# Patient Record
Sex: FEMALE | Race: BLACK OR AFRICAN AMERICAN | NOT HISPANIC OR LATINO | URBAN - METROPOLITAN AREA
[De-identification: names, ages, dates, MRNs, and addresses within clinical notes are randomized per-mention and may not be internally consistent; named-entity substitution may affect disease eponyms.]

---

## 2017-04-20 ENCOUNTER — OUTPATIENT (OUTPATIENT)
Dept: OUTPATIENT SERVICES | Facility: HOSPITAL | Age: 37
LOS: 1 days | End: 2017-04-20

## 2017-04-20 VITALS
HEART RATE: 89 BPM | OXYGEN SATURATION: 96 % | HEIGHT: 67.75 IN | SYSTOLIC BLOOD PRESSURE: 116 MMHG | WEIGHT: 246.04 LBS | RESPIRATION RATE: 16 BRPM | TEMPERATURE: 99 F | DIASTOLIC BLOOD PRESSURE: 78 MMHG

## 2017-04-20 DIAGNOSIS — G47.33 OBSTRUCTIVE SLEEP APNEA (ADULT) (PEDIATRIC): ICD-10-CM

## 2017-04-20 DIAGNOSIS — D21.12 BENIGN NEOPLASM OF CONNECTIVE AND OTHER SOFT TISSUE OF LEFT UPPER LIMB, INCLUDING SHOULDER: ICD-10-CM

## 2017-04-20 LAB
HCT VFR BLD CALC: 37.1 % — SIGNIFICANT CHANGE UP (ref 34.5–45)
HGB BLD-MCNC: 12 G/DL — SIGNIFICANT CHANGE UP (ref 11.5–15.5)
MCHC RBC-ENTMCNC: 31.5 PG — SIGNIFICANT CHANGE UP (ref 27–34)
MCHC RBC-ENTMCNC: 32.3 % — SIGNIFICANT CHANGE UP (ref 32–36)
MCV RBC AUTO: 97.4 FL — SIGNIFICANT CHANGE UP (ref 80–100)
PLATELET # BLD AUTO: 292 K/UL — SIGNIFICANT CHANGE UP (ref 150–400)
PMV BLD: 9.4 FL — SIGNIFICANT CHANGE UP (ref 7–13)
RBC # BLD: 3.81 M/UL — SIGNIFICANT CHANGE UP (ref 3.8–5.2)
RBC # FLD: 13.2 % — SIGNIFICANT CHANGE UP (ref 10.3–14.5)
WBC # BLD: 7.53 K/UL — SIGNIFICANT CHANGE UP (ref 3.8–10.5)
WBC # FLD AUTO: 7.53 K/UL — SIGNIFICANT CHANGE UP (ref 3.8–10.5)

## 2017-04-20 NOTE — H&P PST ADULT - NEGATIVE ENMT SYMPTOMS
no recurrent cold sores/no abnormal taste sensation/no gum bleeding/no sinus symptoms/no nasal discharge/no dysphagia/no ear pain/no throat pain/no dry mouth/no hearing difficulty/no nasal obstruction/no vertigo/no tinnitus/no nose bleeds/no nasal congestion

## 2017-04-20 NOTE — H&P PST ADULT - NEGATIVE NEUROLOGICAL SYMPTOMS
no focal seizures/no vertigo/no transient paralysis/no weakness/no headache/no confusion/no hemiparesis/no difficulty walking/no facial palsy/no generalized seizures/no tremors/no loss of consciousness/no paresthesias/no loss of sensation/no syncope

## 2017-04-20 NOTE — H&P PST ADULT - NEGATIVE GENERAL SYMPTOMS
no polydipsia/no chills/no weight gain/no polyphagia/no weight loss/no fatigue/no fever/no sweating/no anorexia/no malaise/no polyuria

## 2017-04-20 NOTE — H&P PST ADULT - NEGATIVE SKIN SYMPTOMS
no brittle nails/no pitted nails/no itching/no change in size/color of mole/no hair loss/no rash/no dryness

## 2017-04-20 NOTE — H&P PST ADULT - NEGATIVE GENERAL GENITOURINARY SYMPTOMS
no bladder infections/normal libido/no urine discoloration/no nocturia/no incontinence/no flank pain L/no flank pain R/no hematuria/no dysuria

## 2017-04-20 NOTE — H&P PST ADULT - NEGATIVE MUSCULOSKELETAL SYMPTOMS
no myalgia/no stiffness/no arthritis/no muscle cramps/no muscle weakness/no neck pain/no arthralgia/no joint swelling/no arm pain L/no arm pain R/no back pain/no leg pain R/no leg pain L no stiffness/no arm pain R/no leg pain R/no myalgia/no joint swelling/no arthralgia/no leg pain L/no arthritis/no neck pain/no back pain/no muscle cramps/no muscle weakness

## 2017-04-20 NOTE — H&P PST ADULT - PMH
Gestational diabetes    Obesity    Palpitation  with current pregnancy, Seen by cardiologist, echo done not on medication  Seasonal allergies

## 2017-04-20 NOTE — H&P PST ADULT - NEGATIVE PSYCHIATRIC SYMPTOMS
no visual hallucinations/no insomnia/no anxiety/no memory loss/no agitation/no depression/no auditory hallucinations/no hyperactivity/no suicidal ideation/no paranoia/no mood swings

## 2017-04-20 NOTE — H&P PST ADULT - ALLERGY TYPES
reactions to insect bites/indoor environmental allergies/Seasonal and Pet dander/outdoor environmental allergies

## 2017-04-20 NOTE — H&P PST ADULT - NEGATIVE OPHTHALMOLOGIC SYMPTOMS
no discharge L/no loss of vision R/no photophobia/no discharge R/no pain L/no pain R/no diplopia/no blurred vision R/no lacrimation L/no scleral injection L/no blurred vision L/no irritation L/no scleral injection R/no irritation R/no loss of vision L/no lacrimation R

## 2017-04-20 NOTE — H&P PST ADULT - NEGATIVE CARDIOVASCULAR SYMPTOMS
no dyspnea on exertion/no peripheral edema/no claudication/no palpitations/no chest pain/no orthopnea/no paroxysmal nocturnal dyspnea

## 2017-04-20 NOTE — H&P PST ADULT - MUSCULOSKELETAL
details… detailed exam no joint warmth/normal strength/no calf tenderness/ROM intact/no joint swelling/no joint erythema

## 2017-04-20 NOTE — H&P PST ADULT - NEGATIVE BREAST SYMPTOMS
no nipple discharge L/no nipple discharge R/no breast lump L/no breast lump R/no breast tenderness R/no breast tenderness L

## 2017-04-20 NOTE — H&P PST ADULT - MUSCULOSKELETAL COMMENTS
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

## 2017-04-20 NOTE — H&P PST ADULT - GENERAL GENITOURINARY SYMPTOMS
increased urinary frequency/states urgency, stress incontinence  since last pregnancy, will see GYN for further evaluation states urgency, stress incontinence at times since last pregnancy, will see GYN for further evaluation/increased urinary frequency

## 2017-04-20 NOTE — H&P PST ADULT - RS GEN PE MLT RESP DETAILS PC
breath sounds equal/good air movement/airway patent/clear to auscultation bilaterally/respirations non-labored/no chest wall tenderness

## 2017-04-20 NOTE — H&P PST ADULT - FAMILY HISTORY
<<-----Click on this checkbox to enter Family History Family history of diabetes mellitus type II, Paternal Grandmother     Father  Still living? Yes, Estimated age: Age Unknown  Family history of hypertension, Age at diagnosis: Age Unknown     Mother  Still living? Yes, Estimated age: Age Unknown  Family history of hypertension, Age at diagnosis: Age Unknown

## 2017-04-20 NOTE — H&P PST ADULT - PROBLEM SELECTOR PLAN 1
Pt is scheduled for an excision of left axillary mass on 4/26/17. Preop instructions including Pepcid, Hibiclens, npo status and urine cup for pregnancy test in am of sx provided. Verbalized understanding.

## 2017-04-20 NOTE — H&P PST ADULT - LYMPHATICS COMMENTS
no palpable lymphadenopathy noted. DX: no palpable lymphadenopathy noted. 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

## 2017-04-20 NOTE — H&P PST ADULT - PSH
Lazy eye (infant), left  correction  Palpitations  with current pregnancy, Seen by cardiologist, echo done not on medication  S/P  section

## 2017-04-20 NOTE — H&P PST ADULT - NSANTHOSAYNRD_GEN_A_CORE
No. LUCRETIA screening performed.  STOP BANG Legend: 0-2 = LOW Risk; 3-4 = INTERMEDIATE Risk; 5-8 = HIGH Risk/Never tested

## 2017-04-20 NOTE — H&P PST ADULT - HISTORY OF PRESENT ILLNESS
35 y/o female with no PMH. 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 Pt states notes a lump on her armpit for about 8 years. Had mammograms and US's ordered by her GYN that were normal. Recently while putting deodorant started to feel pain to area and after pregnancy noted foul smell coming from that region. Pt then went to see her PCP who referred her t Dr Grossman, who recommended surgical intervention at this time with Dr Lima. 35 y/o female with no PMH. 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 Pt states notes a lump on her armpit for about 8 years. Had mammograms and US's ordered by her GYN that were normal. Recently while putting deodorant started to feel pain to area and after pregnancy noted foul smell coming from that region, no drainage. Pt then went to see her PCP who referred her t Dr Grossman, who recommended surgical intervention at this time with Dr Lima. 37 y/o female with no significant PMH. 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 Pt states notes a lump on her armpit for about 8 years. Had mammograms and US's ordered by her GYN that were normal. Recently while putting deodorant started to feel pain to area and after pregnancy noted foul smell coming from that region, no drainage. Pt then went to see her PCP who referred her to Dr Grossman, who recommended surgical intervention at this time with Dr Lima.

## 2017-04-20 NOTE — H&P PST ADULT - GASTROINTESTINAL DETAILS
soft/no guarding/no masses palpable/nontender/bowel sounds normal/no rebound tenderness/no distention/normal/no bruit

## 2017-04-20 NOTE — H&P PST ADULT - NEGATIVE GASTROINTESTINAL SYMPTOMS
no vomiting/no change in bowel habits/no melena/no nausea/no hematochezia/no diarrhea/no abdominal pain/no constipation/no flatulence

## 2017-04-20 NOTE — H&P PST ADULT - NEGATIVE ALLERGY TYPES
no reactions to food/no outdoor environmental allergies/no reactions to medicines/no reactions to animals/no reactions to insect bites/no indoor environmental allergies no reactions to insect bites/no reactions to medicines/no reactions to food

## 2017-04-25 ENCOUNTER — RESULT REVIEW (OUTPATIENT)
Age: 37
End: 2017-04-25

## 2017-04-26 ENCOUNTER — OUTPATIENT (OUTPATIENT)
Dept: OUTPATIENT SERVICES | Facility: HOSPITAL | Age: 37
LOS: 1 days | Discharge: HOME | End: 2017-04-26
Payer: COMMERCIAL

## 2017-04-26 ENCOUNTER — TRANSCRIPTION ENCOUNTER (OUTPATIENT)
Age: 37
End: 2017-04-26

## 2017-04-26 VITALS
RESPIRATION RATE: 14 BRPM | OXYGEN SATURATION: 100 % | SYSTOLIC BLOOD PRESSURE: 108 MMHG | DIASTOLIC BLOOD PRESSURE: 63 MMHG | TEMPERATURE: 98 F | HEART RATE: 84 BPM

## 2017-04-26 VITALS
TEMPERATURE: 99 F | OXYGEN SATURATION: 100 % | DIASTOLIC BLOOD PRESSURE: 78 MMHG | HEART RATE: 82 BPM | HEIGHT: 67.75 IN | RESPIRATION RATE: 16 BRPM | SYSTOLIC BLOOD PRESSURE: 119 MMHG | WEIGHT: 246.04 LBS

## 2017-04-26 DIAGNOSIS — D21.12 BENIGN NEOPLASM OF CONNECTIVE AND OTHER SOFT TISSUE OF LEFT UPPER LIMB, INCLUDING SHOULDER: ICD-10-CM

## 2017-04-26 PROCEDURE — 88304 TISSUE EXAM BY PATHOLOGIST: CPT | Mod: 26

## 2017-04-26 RX ORDER — OXYCODONE HYDROCHLORIDE 5 MG/1
1 TABLET ORAL
Qty: 15 | Refills: 0 | OUTPATIENT
Start: 2017-04-26

## 2017-04-26 NOTE — ASU DISCHARGE PLAN (ADULT/PEDIATRIC). - ASU FOLLOWUP
[facility] ([facility]):

## 2017-04-26 NOTE — ASU DISCHARGE PLAN (ADULT/PEDIATRIC). - ACTIVITY LEVEL
no sports/gym/no heavy lifting/no exercise no exercise/No lifting more than 20 lbs for 6 weeks./no sports/gym/no heavy lifting

## 2017-04-26 NOTE — ASU DISCHARGE PLAN (ADULT/PEDIATRIC). - NOTIFY
Swelling that continues/Unable to Urinate/Pain not relieved by Medications/Bleeding that does not stop/Inability to Tolerate Liquids or Foods/Persistent Nausea and Vomiting/Fever greater than 101

## 2018-01-16 ENCOUNTER — RESULT REVIEW (OUTPATIENT)
Age: 38
End: 2018-01-16

## 2018-04-19 NOTE — ASU PREOP CHECKLIST - HEIGHT IN FEET
5
1) Tob - none  2) EtOH - none  3) Drugs - none  4) Lives - with son  5) ADLs - needs assistance, limited walking ability 2/2 arthritis pain  6) Vaccines - did not receive flu shot

## 2019-02-21 ENCOUNTER — RESULT REVIEW (OUTPATIENT)
Age: 39
End: 2019-02-21

## 2020-01-14 ENCOUNTER — TRANSCRIPTION ENCOUNTER (OUTPATIENT)
Age: 40
End: 2020-01-14

## 2020-01-28 ENCOUNTER — EMERGENCY (EMERGENCY)
Facility: HOSPITAL | Age: 40
LOS: 1 days | Discharge: HOME | End: 2020-01-28
Attending: EMERGENCY MEDICINE | Admitting: EMERGENCY MEDICINE
Payer: COMMERCIAL

## 2020-01-28 VITALS
DIASTOLIC BLOOD PRESSURE: 81 MMHG | HEART RATE: 76 BPM | RESPIRATION RATE: 14 BRPM | TEMPERATURE: 98 F | OXYGEN SATURATION: 100 % | SYSTOLIC BLOOD PRESSURE: 129 MMHG

## 2020-01-28 VITALS
OXYGEN SATURATION: 100 % | HEART RATE: 97 BPM | TEMPERATURE: 98 F | SYSTOLIC BLOOD PRESSURE: 129 MMHG | DIASTOLIC BLOOD PRESSURE: 87 MMHG | RESPIRATION RATE: 18 BRPM

## 2020-01-28 PROBLEM — O24.419 GESTATIONAL DIABETES MELLITUS IN PREGNANCY, UNSPECIFIED CONTROL: Chronic | Status: ACTIVE | Noted: 2017-04-20

## 2020-01-28 LAB
ALBUMIN SERPL ELPH-MCNC: 4.2 G/DL — SIGNIFICANT CHANGE UP (ref 3.3–5)
ALP SERPL-CCNC: 83 U/L — SIGNIFICANT CHANGE UP (ref 40–120)
ALT FLD-CCNC: 24 U/L — SIGNIFICANT CHANGE UP (ref 4–33)
ANION GAP SERPL CALC-SCNC: 12 MMO/L — SIGNIFICANT CHANGE UP (ref 7–14)
APPEARANCE UR: CLEAR — SIGNIFICANT CHANGE UP
AST SERPL-CCNC: 21 U/L — SIGNIFICANT CHANGE UP (ref 4–32)
BASOPHILS # BLD AUTO: 0.06 K/UL — SIGNIFICANT CHANGE UP (ref 0–0.2)
BASOPHILS NFR BLD AUTO: 0.8 % — SIGNIFICANT CHANGE UP (ref 0–2)
BILIRUB SERPL-MCNC: 0.2 MG/DL — SIGNIFICANT CHANGE UP (ref 0.2–1.2)
BILIRUB UR-MCNC: NEGATIVE — SIGNIFICANT CHANGE UP
BLOOD UR QL VISUAL: NEGATIVE — SIGNIFICANT CHANGE UP
BUN SERPL-MCNC: 12 MG/DL — SIGNIFICANT CHANGE UP (ref 7–23)
CALCIUM SERPL-MCNC: 9.5 MG/DL — SIGNIFICANT CHANGE UP (ref 8.4–10.5)
CHLORIDE SERPL-SCNC: 100 MMOL/L — SIGNIFICANT CHANGE UP (ref 98–107)
CO2 SERPL-SCNC: 25 MMOL/L — SIGNIFICANT CHANGE UP (ref 22–31)
COLOR SPEC: SIGNIFICANT CHANGE UP
CREAT SERPL-MCNC: 0.77 MG/DL — SIGNIFICANT CHANGE UP (ref 0.5–1.3)
EOSINOPHIL # BLD AUTO: 0.32 K/UL — SIGNIFICANT CHANGE UP (ref 0–0.5)
EOSINOPHIL NFR BLD AUTO: 4.5 % — SIGNIFICANT CHANGE UP (ref 0–6)
GLUCOSE SERPL-MCNC: 81 MG/DL — SIGNIFICANT CHANGE UP (ref 70–99)
GLUCOSE UR-MCNC: NEGATIVE — SIGNIFICANT CHANGE UP
HCT VFR BLD CALC: 37.8 % — SIGNIFICANT CHANGE UP (ref 34.5–45)
HGB BLD-MCNC: 12.3 G/DL — SIGNIFICANT CHANGE UP (ref 11.5–15.5)
IMM GRANULOCYTES NFR BLD AUTO: 0.3 % — SIGNIFICANT CHANGE UP (ref 0–1.5)
KETONES UR-MCNC: NEGATIVE — SIGNIFICANT CHANGE UP
LEUKOCYTE ESTERASE UR-ACNC: NEGATIVE — SIGNIFICANT CHANGE UP
LYMPHOCYTES # BLD AUTO: 2.31 K/UL — SIGNIFICANT CHANGE UP (ref 1–3.3)
LYMPHOCYTES # BLD AUTO: 32.7 % — SIGNIFICANT CHANGE UP (ref 13–44)
MCHC RBC-ENTMCNC: 31.4 PG — SIGNIFICANT CHANGE UP (ref 27–34)
MCHC RBC-ENTMCNC: 32.5 % — SIGNIFICANT CHANGE UP (ref 32–36)
MCV RBC AUTO: 96.4 FL — SIGNIFICANT CHANGE UP (ref 80–100)
MONOCYTES # BLD AUTO: 0.67 K/UL — SIGNIFICANT CHANGE UP (ref 0–0.9)
MONOCYTES NFR BLD AUTO: 9.5 % — SIGNIFICANT CHANGE UP (ref 2–14)
NEUTROPHILS # BLD AUTO: 3.68 K/UL — SIGNIFICANT CHANGE UP (ref 1.8–7.4)
NEUTROPHILS NFR BLD AUTO: 52.2 % — SIGNIFICANT CHANGE UP (ref 43–77)
NITRITE UR-MCNC: NEGATIVE — SIGNIFICANT CHANGE UP
NRBC # FLD: 0 K/UL — SIGNIFICANT CHANGE UP (ref 0–0)
PH UR: 7.5 — SIGNIFICANT CHANGE UP (ref 5–8)
PLATELET # BLD AUTO: 304 K/UL — SIGNIFICANT CHANGE UP (ref 150–400)
PMV BLD: 9.6 FL — SIGNIFICANT CHANGE UP (ref 7–13)
POTASSIUM SERPL-MCNC: 4.3 MMOL/L — SIGNIFICANT CHANGE UP (ref 3.5–5.3)
POTASSIUM SERPL-SCNC: 4.3 MMOL/L — SIGNIFICANT CHANGE UP (ref 3.5–5.3)
PROT SERPL-MCNC: 7.6 G/DL — SIGNIFICANT CHANGE UP (ref 6–8.3)
PROT UR-MCNC: NEGATIVE — SIGNIFICANT CHANGE UP
RBC # BLD: 3.92 M/UL — SIGNIFICANT CHANGE UP (ref 3.8–5.2)
RBC # FLD: 12.5 % — SIGNIFICANT CHANGE UP (ref 10.3–14.5)
SODIUM SERPL-SCNC: 137 MMOL/L — SIGNIFICANT CHANGE UP (ref 135–145)
SP GR SPEC: 1.02 — SIGNIFICANT CHANGE UP (ref 1–1.04)
UROBILINOGEN FLD QL: NORMAL — SIGNIFICANT CHANGE UP
WBC # BLD: 7.06 K/UL — SIGNIFICANT CHANGE UP (ref 3.8–10.5)
WBC # FLD AUTO: 7.06 K/UL — SIGNIFICANT CHANGE UP (ref 3.8–10.5)

## 2020-01-28 PROCEDURE — 99284 EMERGENCY DEPT VISIT MOD MDM: CPT

## 2020-01-28 PROCEDURE — 70450 CT HEAD/BRAIN W/O DYE: CPT | Mod: 26

## 2020-01-28 RX ORDER — KETOROLAC TROMETHAMINE 30 MG/ML
15 SYRINGE (ML) INJECTION ONCE
Refills: 0 | Status: DISCONTINUED | OUTPATIENT
Start: 2020-01-28 | End: 2020-01-28

## 2020-01-28 RX ORDER — SODIUM CHLORIDE 9 MG/ML
1000 INJECTION INTRAMUSCULAR; INTRAVENOUS; SUBCUTANEOUS ONCE
Refills: 0 | Status: COMPLETED | OUTPATIENT
Start: 2020-01-28 | End: 2020-01-28

## 2020-01-28 RX ORDER — METOCLOPRAMIDE HCL 10 MG
10 TABLET ORAL ONCE
Refills: 0 | Status: COMPLETED | OUTPATIENT
Start: 2020-01-28 | End: 2020-01-28

## 2020-01-28 RX ADMIN — Medication 10 MILLIGRAM(S): at 13:49

## 2020-01-28 RX ADMIN — SODIUM CHLORIDE 1000 MILLILITER(S): 9 INJECTION INTRAMUSCULAR; INTRAVENOUS; SUBCUTANEOUS at 13:49

## 2020-01-28 RX ADMIN — Medication 15 MILLIGRAM(S): at 13:49

## 2020-01-28 NOTE — ED PROVIDER NOTE - PROGRESS NOTE DETAILS
Cayetano: patient's work up reassuring. Feeling better. Has a neurologist she can follow up with. Return precautions discussed. Patient does not want anything stronger Cayetano: patient's work up reassuring. Feeling better. Has a neurologist she can follow up with. Return precautions discussed. Patient does not want anything stronger than Tylenol/Motrin to go home with. Safe for discharge.

## 2020-01-28 NOTE — ED ADULT NURSE NOTE - OBJECTIVE STATEMENT
Patient A&Ox4, respirations even and unlabored, ambulatory, speaks in clear in full sentences, no neurological deficits observed. Patient arrives with complaints of pressure headache and nausea x 1 week. Denies chest pain, SOB, dizziness, LOC. Left Ac 20g IV placed, labs drawn and sent.

## 2020-01-28 NOTE — ED PROVIDER NOTE - NSFOLLOWUPINSTRUCTIONS_ED_ALL_ED_FT
Your diagnosis: headache    Discharge instructions:    - Please follow up with your neurologist.    - Take any prescribed medications as instructed: Tylenol up to 650 mg every 8 hours as needed for pain and/or Motrin up to 600 mg every 6 hours as needed for pain.     - Be sure to return to the ED if you develop new or worsening symptoms. Specific signs and symptoms to be vigilant of: lightheadedness, dizziness, vertigo, headache, vision changes, slurring of the speech, facial droop, difficulty swallowing, numbness or tingling, muscle weakness, changes in sensation, difficulty walking

## 2020-01-28 NOTE — ED PROVIDER NOTE - ATTENDING CONTRIBUTION TO CARE
Dr. Chakraborty: The nataibe's documentation of the Rapid Assessment Note has been prepared under my (Dr. Chakraborty's) direction after being performed by myself.  I performed an initial evaluation of this patient on my own so as to begin their care.  The care of this patient included resident involvement with a secondary attending only after my Rapid Assessment was performed by myself.  Following my Rapid Assessment, the care of this patient was signed out to a secondary attending whose attestation is separate from mine.  I have personally reviewed the portions of the chart relevant to my initial evaluation.  I agree with the nataibe's documentation of the Rapid Assessment unless otherwise noted here in the chart.

## 2020-01-28 NOTE — ED PROVIDER NOTE - PATIENT PORTAL LINK FT
You can access the FollowMyHealth Patient Portal offered by [facility] by registering at the following website: [email or web address]. By joining [facility]’s FollowMyHealth portal, you will also be able to view your health information using other applications (apps) compatible with our system.

## 2020-01-28 NOTE — ED ADULT TRIAGE NOTE - CHIEF COMPLAINT QUOTE
pt. c/o head pressure x 1 week, denies visual changes, reports mild photophobia, nausea w/out vomiting. LMP 12/26/19.

## 2020-01-28 NOTE — ED PROVIDER NOTE - OBJECTIVE STATEMENT
39F with hx of GERD, vertigo presenting with parieto-occipital pressure in the head for the past week associated with nausea. Intermittent. No obvious triggers. No association with time of day or positional changes. No vomiting. At times endorses bilateral hand and feet numbness. Came today because patient thought something was going to pop due to excessive pressure. Reports mild neck stiffness. No ataxia, vertigo, fever, chills, motor weakness. Doesn't typically have headaches like this.

## 2020-01-28 NOTE — ED PROVIDER NOTE - NS ED ROS FT
GENERAL: no fever, chills  HEENT: no cough, congestion, odynophagia, dysphagia  CARDIAC: no chest pain, palpitations, lightheadedness  PULM: no dyspnea, wheezing   GI: no abdominal pain, nausea, vomiting, diarrhea, constipation, melena, hematochezia  : no urinary dysuria, frequency, incontinence, hematuria  NEURO: + headache  MSK: no joint or muscle pain  SKIN: no rashes, hives  HEME: no active bleeding, bruising

## 2020-01-28 NOTE — H&P PST ADULT - NEGATIVE LYMPHATIC SYMPTOMS
Breath sounds clear and equal bilaterally.
no enlarged lymph nodes/no tender lymph nodes/no swelling of extremity

## 2020-01-28 NOTE — ED PROVIDER NOTE - RAPID ASSESSMENT
38 y/o F with PMHx GERD, and Vertigo presents to ED c/o pressure in head for 1 week that is constant most of the time, but fluctuates. Pt was at work and felt something was going "pop". Pt notes having some burning urination, which is getting better. Pt was watching TV and felt her vision was getting blurry. Pt notes having migraine 2x years ago. Pt denies having any trouble walking.     Signing out to Dr. Hancock

## 2020-01-28 NOTE — ED PROVIDER NOTE - ADDITIONAL NOTES AND INSTRUCTIONS:
Patient, Brianna Hillman, was in the [facility] Emergency Department on 1/28/20. Please excuse her from work on this date.

## 2020-01-28 NOTE — ED ADULT NURSE NOTE - NSIMPLEMENTINTERV_GEN_ALL_ED
Implemented All Universal Safety Interventions:  [place] to call system. Call bell, personal items and telephone within reach. Instruct patient to call for assistance. Room bathroom lighting operational. Non-slip footwear when patient is off stretcher. Physically safe environment: no spills, clutter or unnecessary equipment. Stretcher in lowest position, wheels locked, appropriate side rails in place.

## 2020-01-28 NOTE — ED PROVIDER NOTE - CLINICAL SUMMARY MEDICAL DECISION MAKING FREE TEXT BOX
39F with hx of GERD, vertigo presenting with parieto-occipital pressure in the head for the past week associated with nausea. Normal neuro exam. Low concern for intra-cranial process. However, will obtain labs and CTH to r/o masses. Will give IVF and headache cocktail and reassess.

## 2020-01-30 LAB — SPECIMEN SOURCE: SIGNIFICANT CHANGE UP

## 2020-01-31 LAB
-  AMPICILLIN: SIGNIFICANT CHANGE UP
-  CIPROFLOXACIN: SIGNIFICANT CHANGE UP
-  NITROFURANTOIN: SIGNIFICANT CHANGE UP
-  TETRACYCLINE: SIGNIFICANT CHANGE UP
-  VANCOMYCIN: SIGNIFICANT CHANGE UP
BACTERIA UR CULT: SIGNIFICANT CHANGE UP
METHOD TYPE: SIGNIFICANT CHANGE UP
ORGANISM # SPEC MICROSCOPIC CNT: SIGNIFICANT CHANGE UP
ORGANISM # SPEC MICROSCOPIC CNT: SIGNIFICANT CHANGE UP

## 2020-02-01 RX ORDER — NITROFURANTOIN MACROCRYSTAL 50 MG
1 CAPSULE ORAL
Qty: 14 | Refills: 0
Start: 2020-02-01 | End: 2020-02-07

## 2020-02-01 NOTE — ED POST DISCHARGE NOTE - DETAILS
[phone] - left message [phone] - Pt contacted and discussed results.  Pt c/o dysuria and hematuria since ED visit.  ERX sent to [facility] pharmacy for Macrobid.  Discussed with pt to follow up with PMD.

## 2020-08-10 ENCOUNTER — RESULT REVIEW (OUTPATIENT)
Age: 40
End: 2020-08-10

## 2021-08-19 ENCOUNTER — TRANSCRIPTION ENCOUNTER (OUTPATIENT)
Age: 41
End: 2021-08-19

## 2021-09-10 ENCOUNTER — TRANSCRIPTION ENCOUNTER (OUTPATIENT)
Age: 41
End: 2021-09-10

## 2022-03-02 ENCOUNTER — TRANSCRIPTION ENCOUNTER (OUTPATIENT)
Age: 42
End: 2022-03-02

## 2022-03-07 ENCOUNTER — TRANSCRIPTION ENCOUNTER (OUTPATIENT)
Age: 42
End: 2022-03-07

## 2022-08-28 ENCOUNTER — RESULT REVIEW (OUTPATIENT)
Age: 42
End: 2022-08-28

## 2022-08-29 ENCOUNTER — APPOINTMENT (OUTPATIENT)
Dept: OBGYN | Facility: CLINIC | Age: 42
End: 2022-08-29

## 2022-08-29 PROCEDURE — 81002 URINALYSIS NONAUTO W/O SCOPE: CPT

## 2022-08-29 PROCEDURE — 99396 PREV VISIT EST AGE 40-64: CPT | Mod: 25

## 2022-08-29 PROCEDURE — 76856 US EXAM PELVIC COMPLETE: CPT | Mod: 59

## 2022-08-29 PROCEDURE — 76830 TRANSVAGINAL US NON-OB: CPT

## 2022-10-28 ENCOUNTER — APPOINTMENT (OUTPATIENT)
Dept: ENDOCRINOLOGY | Facility: CLINIC | Age: 42
End: 2022-10-28

## 2022-10-28 VITALS
HEART RATE: 70 BPM | BODY MASS INDEX: 42.69 KG/M2 | WEIGHT: 272 LBS | HEIGHT: 67 IN | DIASTOLIC BLOOD PRESSURE: 84 MMHG | SYSTOLIC BLOOD PRESSURE: 130 MMHG | OXYGEN SATURATION: 98 % | TEMPERATURE: 98.5 F

## 2022-10-28 DIAGNOSIS — R23.2 FLUSHING: ICD-10-CM

## 2022-10-28 DIAGNOSIS — E66.9 OBESITY, UNSPECIFIED: ICD-10-CM

## 2022-10-28 PROCEDURE — 99204 OFFICE O/P NEW MOD 45 MIN: CPT

## 2022-10-31 DIAGNOSIS — R73.03 PREDIABETES.: ICD-10-CM

## 2022-10-31 LAB
ALBUMIN SERPL ELPH-MCNC: 4.2 G/DL
ALP BLD-CCNC: 100 U/L
ALT SERPL-CCNC: 30 U/L
ANION GAP SERPL CALC-SCNC: 13 MMOL/L
AST SERPL-CCNC: 25 U/L
BILIRUB SERPL-MCNC: 0.2 MG/DL
BUN SERPL-MCNC: 14 MG/DL
CALCIUM SERPL-MCNC: 9.9 MG/DL
CHLORIDE SERPL-SCNC: 100 MMOL/L
CO2 SERPL-SCNC: 24 MMOL/L
CREAT SERPL-MCNC: 0.82 MG/DL
EGFR: 92 ML/MIN/1.73M2
ESTIMATED AVERAGE GLUCOSE: 117 MG/DL
ESTRADIOL SERPL-MCNC: 83 PG/ML
FSH SERPL-MCNC: 7.4 IU/L
GLUCOSE SERPL-MCNC: 79 MG/DL
HBA1C MFR BLD HPLC: 5.7 %
LH SERPL-ACNC: 7.4 IU/L
POTASSIUM SERPL-SCNC: 4 MMOL/L
PROT SERPL-MCNC: 7.7 G/DL
SODIUM SERPL-SCNC: 137 MMOL/L
T4 FREE SERPL-MCNC: 1.2 NG/DL
TSH SERPL-ACNC: 1.45 UIU/ML

## 2022-10-31 NOTE — HISTORY OF PRESENT ILLNESS
[FreeTextEntry1] : First visit for class 3 obesity\par \par Follows a special diet: None\par Tried to lose weight before: Yes , however recently reports increased stress and has been more sedentary \par Tried any diet plans/ meal replacements for weight control: No\par Tried OTC or prescription medication for weight control: No\par Activity level: typically inactive with no regular physical activity\par Ease of skin bruising: No \par Proximal muscle weakness: No \par Prior weight loss surgery: No  \par \par \par History of the following:  \par Thyroid disease: No\par Heart disease: No\par Mood disorder: No\par Hypertension: No\par Seizures: No\par

## 2022-10-31 NOTE — PHYSICAL EXAM
[Alert] : alert [Well Nourished] : well nourished [Obese] : obese [No Acute Distress] : no acute distress [Well Developed] : well developed [Normal Sclera/Conjunctiva] : normal sclera/conjunctiva [EOMI] : extra ocular movement intact [No Proptosis] : no proptosis [Normal Oropharynx] : the oropharynx was normal [Thyroid Not Enlarged] : the thyroid was not enlarged [No Thyroid Nodules] : no palpable thyroid nodules [No Respiratory Distress] : no respiratory distress [No Accessory Muscle Use] : no accessory muscle use [Clear to Auscultation] : lungs were clear to auscultation bilaterally [Normal S1, S2] : normal S1 and S2 [Normal Rate] : heart rate was normal [Regular Rhythm] : with a regular rhythm [No Edema] : no peripheral edema [Pedal Pulses Normal] : the pedal pulses are present [Normal Bowel Sounds] : normal bowel sounds [Not Tender] : non-tender [Not Distended] : not distended [Soft] : abdomen soft [Normal Anterior Cervical Nodes] : no anterior cervical lymphadenopathy [Normal Posterior Cervical Nodes] : no posterior cervical lymphadenopathy [No Spinal Tenderness] : no spinal tenderness [Spine Straight] : spine straight [No Stigmata of Cushings Syndrome] : no stigmata of Cushings Syndrome [Normal Gait] : normal gait [Normal Strength/Tone] : muscle strength and tone were normal [No Rash] : no rash [Normal Reflexes] : deep tendon reflexes were 2+ and symmetric [No Tremors] : no tremors [Oriented x3] : oriented to person, place, and time [Acanthosis Nigricans] : no acanthosis nigricans [de-identified] : acanthosis nigicans; no obvious dorsocervical fat pad  [de-identified] : no obvious purple straie

## 2022-10-31 NOTE — ADDENDUM
[FreeTextEntry1] : 10/31/2022: 8:47am\par \par attempted to contact patient at number listed on file to review labs done 10/28/2022: no answer\par \par 10/31/2022: 11:45am\par \par Contacted patient at number listed on file to review labs done 10/28/2022. Patient agrees to review over phone\par \par A1c: 5.7%, c/w pre-DM, reinforced diet and exercise as discussed during visit for weight loss\par \par Hot flashes, TFTs WNL. FSH/ LH, estradiol not suggestive of ovarian insufficiency, could possibly be perimenopausal symptoms. However, no clear primary endocrine cause .

## 2022-11-29 ENCOUNTER — NON-APPOINTMENT (OUTPATIENT)
Age: 42
End: 2022-11-29

## 2023-07-05 NOTE — H&P PST ADULT - BLOOD AVOIDANCE/RESTRICTIONS, PROFILE
BP controlled on HCTz and losartan .    
Check labs    
May be due to dehydration    
On  Atorvastatin and check labs.    
On levothyroxine and check labs    
Saw rheumatology   
none

## 2023-09-30 ENCOUNTER — NON-APPOINTMENT (OUTPATIENT)
Age: 43
End: 2023-09-30

## 2024-02-06 ENCOUNTER — NON-APPOINTMENT (OUTPATIENT)
Age: 44
End: 2024-02-06

## 2024-03-08 ENCOUNTER — EMERGENCY (EMERGENCY)
Facility: HOSPITAL | Age: 44
LOS: 1 days | Discharge: HOME | End: 2024-03-08
Attending: EMERGENCY MEDICINE | Admitting: EMERGENCY MEDICINE
Payer: COMMERCIAL

## 2024-03-08 VITALS
RESPIRATION RATE: 16 BRPM | OXYGEN SATURATION: 100 % | SYSTOLIC BLOOD PRESSURE: 137 MMHG | TEMPERATURE: 98 F | HEART RATE: 90 BPM | DIASTOLIC BLOOD PRESSURE: 80 MMHG

## 2024-03-08 LAB
ADD ON TEST-SPECIMEN IN LAB: SIGNIFICANT CHANGE UP
ALBUMIN SERPL ELPH-MCNC: 3.9 G/DL — SIGNIFICANT CHANGE UP (ref 3.3–5)
ALP SERPL-CCNC: 84 U/L — SIGNIFICANT CHANGE UP (ref 40–120)
ALT FLD-CCNC: 27 U/L — SIGNIFICANT CHANGE UP (ref 4–33)
ANION GAP SERPL CALC-SCNC: 8 MMOL/L — SIGNIFICANT CHANGE UP (ref 7–14)
AST SERPL-CCNC: 24 U/L — SIGNIFICANT CHANGE UP (ref 4–32)
BASOPHILS # BLD AUTO: 0.07 K/UL — SIGNIFICANT CHANGE UP (ref 0–0.2)
BASOPHILS NFR BLD AUTO: 1.6 % — SIGNIFICANT CHANGE UP (ref 0–2)
BILIRUB SERPL-MCNC: 0.4 MG/DL — SIGNIFICANT CHANGE UP (ref 0.2–1.2)
BUN SERPL-MCNC: 14 MG/DL — SIGNIFICANT CHANGE UP (ref 7–23)
CALCIUM SERPL-MCNC: 9.2 MG/DL — SIGNIFICANT CHANGE UP (ref 8.4–10.5)
CHLORIDE SERPL-SCNC: 105 MMOL/L — SIGNIFICANT CHANGE UP (ref 98–107)
CO2 SERPL-SCNC: 26 MMOL/L — SIGNIFICANT CHANGE UP (ref 22–31)
CREAT SERPL-MCNC: 0.81 MG/DL — SIGNIFICANT CHANGE UP (ref 0.5–1.3)
EGFR: 92 ML/MIN/1.73M2 — SIGNIFICANT CHANGE UP
EOSINOPHIL # BLD AUTO: 0.21 K/UL — SIGNIFICANT CHANGE UP (ref 0–0.5)
EOSINOPHIL NFR BLD AUTO: 4.9 % — SIGNIFICANT CHANGE UP (ref 0–6)
GLUCOSE SERPL-MCNC: 89 MG/DL — SIGNIFICANT CHANGE UP (ref 70–99)
HCT VFR BLD CALC: 38.2 % — SIGNIFICANT CHANGE UP (ref 34.5–45)
HGB BLD-MCNC: 13.1 G/DL — SIGNIFICANT CHANGE UP (ref 11.5–15.5)
IANC: 1.94 K/UL — SIGNIFICANT CHANGE UP (ref 1.8–7.4)
IMM GRANULOCYTES NFR BLD AUTO: 0 % — SIGNIFICANT CHANGE UP (ref 0–0.9)
LYMPHOCYTES # BLD AUTO: 1.66 K/UL — SIGNIFICANT CHANGE UP (ref 1–3.3)
LYMPHOCYTES # BLD AUTO: 38.4 % — SIGNIFICANT CHANGE UP (ref 13–44)
MAGNESIUM SERPL-MCNC: 2.1 MG/DL — SIGNIFICANT CHANGE UP (ref 1.6–2.6)
MCHC RBC-ENTMCNC: 32.3 PG — SIGNIFICANT CHANGE UP (ref 27–34)
MCHC RBC-ENTMCNC: 34.3 GM/DL — SIGNIFICANT CHANGE UP (ref 32–36)
MCV RBC AUTO: 94.3 FL — SIGNIFICANT CHANGE UP (ref 80–100)
MONOCYTES # BLD AUTO: 0.44 K/UL — SIGNIFICANT CHANGE UP (ref 0–0.9)
MONOCYTES NFR BLD AUTO: 10.2 % — SIGNIFICANT CHANGE UP (ref 2–14)
NEUTROPHILS # BLD AUTO: 1.94 K/UL — SIGNIFICANT CHANGE UP (ref 1.8–7.4)
NEUTROPHILS NFR BLD AUTO: 44.9 % — SIGNIFICANT CHANGE UP (ref 43–77)
NRBC # BLD: 0 /100 WBCS — SIGNIFICANT CHANGE UP (ref 0–0)
NRBC # FLD: 0 K/UL — SIGNIFICANT CHANGE UP (ref 0–0)
PHOSPHATE SERPL-MCNC: 2.8 MG/DL — SIGNIFICANT CHANGE UP (ref 2.5–4.5)
PLATELET # BLD AUTO: 305 K/UL — SIGNIFICANT CHANGE UP (ref 150–400)
POTASSIUM SERPL-MCNC: 4.4 MMOL/L — SIGNIFICANT CHANGE UP (ref 3.5–5.3)
POTASSIUM SERPL-SCNC: 4.4 MMOL/L — SIGNIFICANT CHANGE UP (ref 3.5–5.3)
PROT SERPL-MCNC: 7.1 G/DL — SIGNIFICANT CHANGE UP (ref 6–8.3)
RBC # BLD: 4.05 M/UL — SIGNIFICANT CHANGE UP (ref 3.8–5.2)
RBC # FLD: 12.3 % — SIGNIFICANT CHANGE UP (ref 10.3–14.5)
SODIUM SERPL-SCNC: 139 MMOL/L — SIGNIFICANT CHANGE UP (ref 135–145)
WBC # BLD: 4.32 K/UL — SIGNIFICANT CHANGE UP (ref 3.8–10.5)
WBC # FLD AUTO: 4.32 K/UL — SIGNIFICANT CHANGE UP (ref 3.8–10.5)

## 2024-03-08 PROCEDURE — 93010 ELECTROCARDIOGRAM REPORT: CPT

## 2024-03-08 PROCEDURE — 99285 EMERGENCY DEPT VISIT HI MDM: CPT

## 2024-03-08 NOTE — ED PROVIDER NOTE - NSICDXFAMILYHX_GEN_ALL_CORE_FT
FAMILY HISTORY:  Family history of diabetes mellitus type II, Paternal Grandmother    Father  Still living? Yes, Estimated age: Age Unknown  Family history of hypertension, Age at diagnosis: Age Unknown    Mother  Still living? Yes, Estimated age: Age Unknown  Family history of hypertension, Age at diagnosis: Age Unknown

## 2024-03-08 NOTE — ED PROVIDER NOTE - DIFFERENTIAL DIAGNOSIS
electrolyte abnormality, dehydration, less likely neuropathy, less likely primary neurological condition such as MS, no signs of cord compression/radiculopathy of the neck Differential Diagnosis

## 2024-03-08 NOTE — ED ADULT TRIAGE NOTE - CHIEF COMPLAINT QUOTE
pt c/o intermittent "tingling and numbness to bi-lateral hands and right leg x1 week". also c/o chest heaviness. hx. anxiety. pt denies dizziness, headaches, vision changes. pt well appearing. awaiting ekg.

## 2024-03-08 NOTE — ED PROVIDER NOTE - PHYSICAL EXAMINATION
General: well appearing, interactive, well nourished, no apparent distress, ncat  HEENT: EOMI, PERRLA, normal mucosa, normal oropharynx, no lesions on the lips or on oral mucosa, normal external ear  Neck: supple, no lymphadenopathy, full range of motion, no nuchal rigidity  CV: RRR, normal S1 and S2 with no murmur, capillary refill less than two seconds  Resp: lungs CTA b/l, good aeration bilaterally, symmetric chest wall   Abd: non-distended, soft, non-tender  : no CVA tenderness  MSK: full range of motion, no cyanosis, no edema, no clubbing, no immobility  Neuro: CN II-XII grossly intact, muscle strength 5/5 in all extremities, sensation intact in all extremities, normal gait. DTR 2+ in all extremities.   Skin: no rashes, skin intact

## 2024-03-08 NOTE — ED PROVIDER NOTE - PATIENT PORTAL LINK FT
You can access the FollowMyHealth Patient Portal offered by [facility] by registering at the following website: [email or web address]. By joining [employer]’s FollowMyHealth portal, you will also be able to view your health information using other applications (apps) compatible with our system.

## 2024-03-08 NOTE — ED PROVIDER NOTE - PROGRESS NOTE DETAILS
Pro Maier DO (PGY-1) Labs non-actionable. Patient without red flag symptoms for acute pathology. Patient given return precautions, will follow up with neurology outpatient.

## 2024-03-08 NOTE — ED PROVIDER NOTE - ATTENDING CONTRIBUTION TO CARE
Dr. Chakraborty: This is a 43-year-old female with history of hypertension on amlodipine, in the emergency department with waxing and waning tingling in both of her arms and her right leg.  She states that this has been going on for 1 week but has had similar in the past.  This has been accompanied by nausea but no vomiting or diarrhea.  There is no fever, abdominal pain, urinary symptoms.  Patient states that sometimes she notes discomfort in her chest associated with shortness of breath, but not currently.  She denies headache, changes in her vision, dizziness.  Patient states that she saw a neurologist for this over 5 years ago, no specific diagnosis was made.  On exam pt well appearing, in NAD, heart RRR, lungs CTAB, abd NTND, extremities without swelling, strength 5/5 in all extremities and skin without rash.  Normal gait.  EKG without acute ischemic changes.

## 2024-03-08 NOTE — ED ADULT NURSE NOTE - OBJECTIVE STATEMENT
patient  Alert & ox3. pt c/o intermittent "tingling and numbness to bi-lateral upper and lower extremitiesx1 week". also c/o chest heaviness. .pt . evaluated by MD.No complains of chest  pain  noted at this time. Due  labs drawn and sent. Patient is comfortable , patient will be waiting for results, further evaluation and disposition.  made comfortable.will continue to monitor.

## 2024-03-08 NOTE — ED PROVIDER NOTE - NSICDXPASTSURGICALHX_GEN_ALL_CORE_FT
PAST SURGICAL HISTORY:  Lazy eye (infant), left correction    Palpitations with current pregnancy, Seen by cardiologist, echo done not on medication    S/P  section

## 2024-03-08 NOTE — ED PROVIDER NOTE - NSFOLLOWUPINSTRUCTIONS_ED_ALL_ED_FT
Peripheral Neuropathy    WHAT YOU NEED TO KNOW:    Peripheral neuropathy is a condition that affects how your nerves work. Nerves carry information from your brain to your body. The information does not transfer along your nerves correctly when you have neuropathy. When you have peripheral neuropathy, the nerves in your legs, arms, feet, or hands are affected. It also may affect your organs, such as your lungs, stomach, bladder, or genitals. This condition may go away on its own or you may always have it.    DISCHARGE INSTRUCTIONS:    Medicines:     Pain medicine: You may be given medicine to take away or decrease pain. Do not wait until the pain is severe before you take your medicine.      Antidepressants: This medicine helps to decrease or stop the symptoms of depression. It also used to help decrease pain. Take this medicine as directed.      Antiseizure medicine: This medicine is usually given to control seizures, but it also helps with nerve pain.       Take your medicine as directed. Contact your healthcare provider if you think your medicine is not helping or if you have side effects. Tell him of her if you are allergic to any medicine. Keep a list of the medicines, vitamins, and herbs you take. Include the amounts, and when and why you take them. Bring the list or the pill bottles to follow-up visits. Carry your medicine list with you in case of an emergency.    Follow up with your healthcare provider or neurologist as directed: Write down your questions so you remember to ask them during your visits.    Physical therapy: Physical and occupational therapists may help you exercise your arms, legs, and hands. They may teach you new ways to do things at home.    Brace or splint: You may need a device that supports or holds a body part still. For example, if you have carpal tunnel syndrome, you may need to wear a wrist brace.    Manage your peripheral neuropathy:     Avoid falls: Move with care and stand up slowly. Wear shoes that support your feet, and do not go barefoot. Ask about walking aids, such as a cane or walker. You may want to install railings or nonslip pads in your home, especially in the bathroom. Ask for more information on how to avoid falls.           Check your skin daily: Sores can form where your skin makes contact with chairs, beds, or other body parts. They also can form under splints. Keep your skin clean, and check your skin daily for sores.      Exercise: Ask about the best exercise plan for you. Physical activity may increase your balance and strength and may decrease your pain. It is best to start exercising slowly and do more as you get stronger.     Contact your healthcare provider or neurologist if:     Your pain is severe.       You cannot control your bladder.       You have trouble having sex.      You have questions or concerns about your condition or care.    Return to the emergency department if:     You fall.       You cannot walk at all.

## 2024-03-08 NOTE — ED ADULT NURSE NOTE - NSFALLHARMRISKINTERV_ED_ALL_ED

## 2024-03-08 NOTE — ED ADULT NURSE NOTE - NSICDXFAMILYHX_GEN_ALL_CORE_FT
FAMILY HISTORY:  Family history of diabetes mellitus type II, Paternal Grandmother    Father  Still living? Yes, Estimated age: Age Unknown  Family history of hypertension, Age at diagnosis: Age Unknown    Mother  Still living? Yes, Estimated age: Age Unknown  Family history of hypertension, Age at diagnosis: Age Unknown     Partial Purse String (Simple) Text: Given the location of the defect and the characteristics of the surrounding skin a simple purse string closure was deemed most appropriate.  Undermining was performed circumferentially around the surgical defect.  A purse string suture was then placed and tightened. Wound tension of the circular defect prevented complete closure of the wound.

## 2024-03-08 NOTE — ED PROVIDER NOTE - NSICDXPASTMEDICALHX_GEN_ALL_CORE_FT
PAST MEDICAL HISTORY:  Gestational diabetes     Obesity     Palpitation with current pregnancy, Seen by cardiologist, echo done not on medication    Seasonal allergies

## 2024-03-08 NOTE — ED PROVIDER NOTE - CLINICAL SUMMARY MEDICAL DECISION MAKING FREE TEXT BOX
Patient is a 43-year-old female past medical history for hypertension and amlodipine presents complaining of bilateral hand tingling and numbness, right buttock/right leg tingling sensation for 1 week.  Also notes some mild nausea, no vomiting, no diarrhea, no abdominal pain, no fever, no chills.  Despite triage note patient denied any chest pressure but noted a full torso "discomfort" a few days ago which she states resolved.  Denies any chest pain, palpitations, shortness of breath.  Patient notes due to personal life/social circumstances she has been feeling a lot more stressed/anxious over this time period.  Denies vision changes, no blurry vision, no lightheadedness, no dizziness.  Denies history of similar episodes.   Patient denies any fevers, chills, constipation, painful urination, new rashes.   Denies ETOH use. States she recently discontinued using THC gummies.  VSS  On exam patient is well appearing with no noted neuro deficits.   EKG NSR no CAROLYN, NO STD  DDX- Acute Peripheral neuropathy. Very low suspicion for Guillain-Mineola. Low suspicion for vasculitis. Extremely low suspicion for stroke/tia iso history of present illness and exam without neuro deficits. Will obtain finger stick to ensure patient is not hyperglycemic which may point to diabetic neuropathy, although relatively lower suspicion.

## 2024-03-08 NOTE — ED PROVIDER NOTE - OBJECTIVE STATEMENT
Patient is a 43-year-old female past medical history for hypertension and amlodipine presents complaining of bilateral hand tingling and numbness, right buttock/right leg tingling sensation for 1 week.  Also notes some mild nausea, no vomiting, no diarrhea, no abdominal pain, no fever, no chills.  Despite triage note patient denied any chest pressure but noted a full torso "discomfort" a few days ago which she states resolved.  Denies any chest pain, palpitations, shortness of breath.  Patient notes due to personal life/social circumstances she has been feeling a lot more stressed/anxious over this time period.  Denies vision changes, no blurry vision, no lightheadedness, no dizziness.  Denies history of similar episodes.   Patient denies any fevers, chills, constipation, painful urination, new rashes.   Denies ETOH use. States she recently discontinued using THC gummies. Patient is a 43-year-old female past medical history for hypertension and amlodipine presents complaining of bilateral hand tingling and numbness, right buttock/right leg tingling sensation for 1 week.  Also notes some mild nausea, no vomiting, no diarrhea, no abdominal pain, no fever, no chills.  Despite triage note patient denied any chest pressure but noted a full torso "discomfort" a few days ago which she states resolved.  Denies any chest pain, palpitations, shortness of breath.  Patient notes due to personal life/social circumstances she has been feeling a lot more stressed/anxious over this time period.  Denies HA, vision changes, no blurry vision, no lightheadedness, no dizziness.  Denies history of similar episodes.   Patient denies any fevers, chills, constipation, painful urination, new rashes.   Denies ETOH use. States she recently discontinued using THC gummies.

## 2024-05-26 ENCOUNTER — NON-APPOINTMENT (OUTPATIENT)
Age: 44
End: 2024-05-26

## 2024-09-30 ENCOUNTER — APPOINTMENT (OUTPATIENT)
Dept: OBGYN | Facility: CLINIC | Age: 44
End: 2024-09-30

## 2024-09-30 PROCEDURE — 99396 PREV VISIT EST AGE 40-64: CPT

## 2024-09-30 PROCEDURE — 76830 TRANSVAGINAL US NON-OB: CPT

## 2024-09-30 PROCEDURE — 81002 URINALYSIS NONAUTO W/O SCOPE: CPT

## 2024-09-30 PROCEDURE — 99459 PELVIC EXAMINATION: CPT

## 2024-10-29 ENCOUNTER — APPOINTMENT (OUTPATIENT)
Dept: OBGYN | Facility: CLINIC | Age: 44
End: 2024-10-29
Payer: COMMERCIAL

## 2024-10-29 PROCEDURE — 58301 REMOVE INTRAUTERINE DEVICE: CPT

## 2024-10-29 PROCEDURE — 76830 TRANSVAGINAL US NON-OB: CPT

## 2024-11-13 NOTE — PACU DISCHARGE NOTE - MENTAL STATUS: PATIENT PARTICIPATION
Awake
You can access the FollowMyHealth Patient Portal offered by [facility] by registering at the following website: [email or web address]. By joining [employer]’s FollowMyHealth portal, you will also be able to view your health information using other applications (apps) compatible with our system.

## 2025-01-06 ENCOUNTER — APPOINTMENT (OUTPATIENT)
Dept: OBGYN | Facility: CLINIC | Age: 45
End: 2025-01-06
Payer: COMMERCIAL

## 2025-01-06 PROCEDURE — 99213 OFFICE O/P EST LOW 20 MIN: CPT

## 2025-01-28 ENCOUNTER — NON-APPOINTMENT (OUTPATIENT)
Age: 45
End: 2025-01-28

## 2025-02-11 ENCOUNTER — APPOINTMENT (OUTPATIENT)
Dept: ENDOCRINOLOGY | Facility: CLINIC | Age: 45
End: 2025-02-11
Payer: COMMERCIAL

## 2025-02-11 VITALS
BODY MASS INDEX: 40.65 KG/M2 | HEART RATE: 81 BPM | OXYGEN SATURATION: 100 % | TEMPERATURE: 97.8 F | SYSTOLIC BLOOD PRESSURE: 118 MMHG | RESPIRATION RATE: 18 BRPM | HEIGHT: 67 IN | DIASTOLIC BLOOD PRESSURE: 78 MMHG | WEIGHT: 259 LBS

## 2025-02-11 DIAGNOSIS — R73.03 PREDIABETES.: ICD-10-CM

## 2025-02-11 DIAGNOSIS — I10 ESSENTIAL (PRIMARY) HYPERTENSION: ICD-10-CM

## 2025-02-11 DIAGNOSIS — E66.813 OBESITY, CLASS 3: ICD-10-CM

## 2025-02-11 PROCEDURE — 99214 OFFICE O/P EST MOD 30 MIN: CPT

## 2025-02-11 RX ORDER — SEMAGLUTIDE 0.25 MG/.5ML
0.25 INJECTION, SOLUTION SUBCUTANEOUS
Qty: 1 | Refills: 2 | Status: ACTIVE | COMMUNITY
Start: 2025-02-11 | End: 1900-01-01

## 2025-02-11 RX ORDER — AMLODIPINE BESYLATE 5 MG/1
TABLET ORAL
Refills: 0 | Status: ACTIVE | COMMUNITY

## 2025-02-12 LAB
ALBUMIN SERPL ELPH-MCNC: 4 G/DL
ALP BLD-CCNC: 93 U/L
ALT SERPL-CCNC: 24 U/L
AST SERPL-CCNC: 23 U/L
BILIRUB DIRECT SERPL-MCNC: 0.1 MG/DL
BILIRUB INDIRECT SERPL-MCNC: 0.1 MG/DL
BILIRUB SERPL-MCNC: 0.2 MG/DL
CALCIUM SERPL-MCNC: 9.3 MG/DL
CREAT SERPL-MCNC: 0.79 MG/DL
EGFR: 94 ML/MIN/1.73M2
ESTIMATED AVERAGE GLUCOSE: 114 MG/DL
HBA1C MFR BLD HPLC: 5.6 %
HCG SERPL-MCNC: <1 MIU/ML
PROT SERPL-MCNC: 6.8 G/DL
T4 FREE SERPL-MCNC: 1.3 NG/DL
TSH SERPL-ACNC: 1.62 UIU/ML

## 2025-04-22 ENCOUNTER — APPOINTMENT (OUTPATIENT)
Dept: ENDOCRINOLOGY | Facility: CLINIC | Age: 45
End: 2025-04-22